# Patient Record
(demographics unavailable — no encounter records)

---

## 2024-12-20 NOTE — ASSESSMENT
[FreeTextEntry1] : Impression: Mild structural/dynamic contractures both Achilles.  Mom will continue with aggressive stretching program at home she has been given prescription to have new dynamic stretching AFOs fabricated.  I will see the child in 6 months

## 2024-12-20 NOTE — HISTORY OF PRESENT ILLNESS
[FreeTextEntry1] : This 6 pleasant 10-year-old child with normal birth history is seen today for evaluation of the ankles.  This patient recently moved from [place] her father is in the  up at [employer].  This child for a number of years has been treated for toe walking with formal physical therapy as well as nighttime dynamic stretching AFO on both sides.  The braces are 2 years old and no longer fit past history is otherwise unremarkable

## 2024-12-20 NOTE — PHYSICAL EXAM
[FreeTextEntry1] : Examination today reveals level pelvis symmetric leg lengths.  Stance is unremarkable her gait reveals she has a tendency to walk up on the balls of her feet and at times will just go frankly up onto her toes.  With coaxing she is able to walk with a heel strike that is acceptable.  She has supple motion to both hips and knees no instability noted.  Both ankle and feet have mild contractures of the Achilles.  With the knees extended she can be dorsiflexed to just beyond neutral.  The sole of the foot reveals no points of tenderness or callosities present.  Neurologically no focal deficit is noted no evidence of spasticity.
Started first trimester

## 2024-12-20 NOTE — CONSULT LETTER
[Dear  ___] : Dear  [unfilled], [Consult Letter:] : I had the pleasure of evaluating your patient, [unfilled]. [Please see my note below.] : Please see my note below. [Consult Closing:] : Thank you very much for allowing me to participate in the care of this patient.  If you have any questions, please do not hesitate to contact me. [Sincerely,] : Sincerely, [FreeTextEntry3] : Dr Kingsley Quevedo JR.